# Patient Record
(demographics unavailable — no encounter records)

---

## 2023-12-12 DIAGNOSIS — R00.2 PALPITATIONS: ICD-10-CM

## 2023-12-12 DIAGNOSIS — I10 ESSENTIAL (PRIMARY) HYPERTENSION: ICD-10-CM

## 2023-12-14 PROBLEM — R06.02 SHORTNESS OF BREATH: Status: ACTIVE | Noted: 2023-12-14

## 2023-12-14 PROBLEM — R07.9 CHEST PAIN: Status: ACTIVE | Noted: 2023-12-14

## 2023-12-14 PROBLEM — E66.01 MORBID OBESITY (MULTI): Status: ACTIVE | Noted: 2023-12-14

## 2023-12-14 PROBLEM — E11.9 DIABETES MELLITUS (MULTI): Status: ACTIVE | Noted: 2023-12-14

## 2023-12-14 PROBLEM — I10 ESSENTIAL HYPERTENSION, BENIGN: Status: ACTIVE | Noted: 2023-12-14

## 2023-12-14 PROBLEM — R00.2 PALPITATIONS: Status: ACTIVE | Noted: 2023-12-14

## 2023-12-14 PROBLEM — F20.9 SCHIZOPHRENIA (MULTI): Status: ACTIVE | Noted: 2023-12-14

## 2023-12-14 PROBLEM — E88.810 METABOLIC SYNDROME: Status: ACTIVE | Noted: 2023-12-14

## 2023-12-14 RX ORDER — SITAGLIPTIN 100 MG/1
TABLET, FILM COATED ORAL
COMMUNITY
Start: 2023-12-12

## 2023-12-14 RX ORDER — OLANZAPINE 20 MG/1
20 TABLET ORAL NIGHTLY
COMMUNITY
Start: 2023-10-30

## 2023-12-14 RX ORDER — METOPROLOL TARTRATE 25 MG/1
25 TABLET, FILM COATED ORAL 2 TIMES DAILY
Qty: 180 TABLET | Refills: 0 | Status: SHIPPED | OUTPATIENT
Start: 2023-12-14 | End: 2024-03-29

## 2023-12-14 RX ORDER — METOPROLOL TARTRATE 25 MG/1
25 TABLET, FILM COATED ORAL 2 TIMES DAILY
COMMUNITY

## 2023-12-14 RX ORDER — OLANZAPINE 10 MG/1
10 TABLET ORAL
COMMUNITY
Start: 2023-10-13

## 2023-12-14 RX ORDER — ESCITALOPRAM OXALATE 20 MG/1
20 TABLET ORAL DAILY
COMMUNITY
Start: 2023-09-02

## 2023-12-14 RX ORDER — INSULIN LISPRO 100 [IU]/ML
INJECTION, SOLUTION INTRAVENOUS; SUBCUTANEOUS
COMMUNITY
Start: 2023-10-10

## 2023-12-14 RX ORDER — GABAPENTIN 400 MG/1
400 CAPSULE ORAL 3 TIMES DAILY
COMMUNITY
Start: 2023-10-30

## 2023-12-14 RX ORDER — ALPRAZOLAM 1 MG/1
1 TABLET ORAL 3 TIMES DAILY PRN
COMMUNITY
Start: 2023-11-15

## 2023-12-14 RX ORDER — INSULIN GLARGINE 100 [IU]/ML
INJECTION, SOLUTION SUBCUTANEOUS
COMMUNITY
Start: 2023-10-23

## 2023-12-14 NOTE — TELEPHONE ENCOUNTER
Please deny Rx. Patient only seen in ED. No showed or cancelled appointments. Never been seen in office.

## 2024-03-29 DIAGNOSIS — I10 ESSENTIAL (PRIMARY) HYPERTENSION: ICD-10-CM

## 2024-03-29 DIAGNOSIS — R00.2 PALPITATIONS: ICD-10-CM

## 2024-03-29 RX ORDER — METOPROLOL TARTRATE 25 MG/1
25 TABLET, FILM COATED ORAL 2 TIMES DAILY
Qty: 180 TABLET | Refills: 0 | Status: SHIPPED | OUTPATIENT
Start: 2024-03-29